# Patient Record
(demographics unavailable — no encounter records)

---

## 2019-11-10 NOTE — NUR
Called [facility] pharmacy to request med list, location closed for the evening. Reported to VENITA Curtis RN, nightshift to follow up with pharmacy list for home medications to be entered 
and reconciled in AM. 

-------------------------------------------------------------------------------

Addendum: 11/10/19 at 2121 by JUDSON SANCHEZ RN RN

-------------------------------------------------------------------------------

[facility] pharmacy in [place], [place].

## 2019-11-11 NOTE — NUR
pt decided not to stay and left AMA.

in no distress, does not want to wait for tomorrow, states feels in good condition to leave 
home ama. wife at bedside.

## 2019-11-11 NOTE — NUR
SAURABH NOTES

PT IN RESTROOM EARLIER, WILL TRY AGAIN LATER, 

TRIGGERS TO CM  

-------------------------------------------------------------------------------

Addendum: 11/13/19 at 1201 by CHETAN MCGARRY RN CM

-------------------------------------------------------------------------------

Amended: Links added.